# Patient Record
Sex: MALE | Race: WHITE | NOT HISPANIC OR LATINO
[De-identification: names, ages, dates, MRNs, and addresses within clinical notes are randomized per-mention and may not be internally consistent; named-entity substitution may affect disease eponyms.]

---

## 2022-08-23 PROBLEM — Z00.00 ENCOUNTER FOR PREVENTIVE HEALTH EXAMINATION: Status: ACTIVE | Noted: 2022-08-23

## 2022-08-30 ENCOUNTER — APPOINTMENT (OUTPATIENT)
Dept: OTOLARYNGOLOGY | Facility: CLINIC | Age: 37
End: 2022-08-30

## 2022-08-30 VITALS
BODY MASS INDEX: 28.63 KG/M2 | HEIGHT: 70 IN | DIASTOLIC BLOOD PRESSURE: 76 MMHG | WEIGHT: 200 LBS | HEART RATE: 81 BPM | SYSTOLIC BLOOD PRESSURE: 122 MMHG

## 2022-08-30 DIAGNOSIS — J31.0 CHRONIC RHINITIS: ICD-10-CM

## 2022-08-30 DIAGNOSIS — R07.0 PAIN IN THROAT: ICD-10-CM

## 2022-08-30 DIAGNOSIS — F84.0 AUTISTIC DISORDER: ICD-10-CM

## 2022-08-30 DIAGNOSIS — R05.9 COUGH, UNSPECIFIED: ICD-10-CM

## 2022-08-30 PROCEDURE — 99204 OFFICE O/P NEW MOD 45 MIN: CPT | Mod: 25

## 2022-08-30 PROCEDURE — 31575 DIAGNOSTIC LARYNGOSCOPY: CPT

## 2022-08-30 RX ORDER — AZELASTINE HYDROCHLORIDE 137 UG/1
0.1 SPRAY, METERED NASAL TWICE DAILY
Qty: 3 | Refills: 3 | Status: ACTIVE | COMMUNITY
Start: 2022-08-30 | End: 1900-01-01

## 2022-08-30 RX ORDER — FLUOXETINE HYDROCHLORIDE 40 MG/1
CAPSULE ORAL
Refills: 0 | Status: ACTIVE | COMMUNITY

## 2022-08-30 NOTE — REVIEW OF SYSTEMS
[Sneezing] : sneezing [Post Nasal Drip] : post nasal drip [Throat Clearing] : throat clearing [Cough] : cough [Anxiety] : anxiety [Depression] : depression [Negative] : Heme/Lymph

## 2022-08-30 NOTE — HISTORY OF PRESENT ILLNESS
[de-identified] : 36 yr old male w autism spectrum seen with his mother c/o non-productive  cough since going to program 5d/week a few months becoming more persistent, occurs every few minutes\par no relief w zyrtec\par fluoxetine dose was increased when he started the program (followed by psychiatrist)\par no cough during sleep\par +throat clearing, throat  pain\par denies heartburn, GERD\par ?dx of Tourette's \par -cigs

## 2023-08-21 ENCOUNTER — OFFICE (OUTPATIENT)
Dept: URBAN - METROPOLITAN AREA CLINIC 109 | Facility: CLINIC | Age: 38
Setting detail: OPHTHALMOLOGY
End: 2023-08-21
Payer: MEDICARE

## 2023-08-21 DIAGNOSIS — H40.013: ICD-10-CM

## 2023-08-21 PROCEDURE — 99213 OFFICE O/P EST LOW 20 MIN: CPT | Performed by: OPHTHALMOLOGY

## 2023-08-21 ASSESSMENT — VISUAL ACUITY
OD_BCVA: 20/25
OS_BCVA: 20/25+2

## 2023-08-21 ASSESSMENT — SPHEQUIV_DERIVED
OD_SPHEQUIV: -9.125
OS_SPHEQUIV: -0.375
OS_SPHEQUIV: -8.875

## 2023-08-21 ASSESSMENT — REFRACTION_CURRENTRX
OD_VPRISM_DIRECTION: SV
OS_CYLINDER: -1.50
OS_SPHERE: -6.75
OS_VPRISM_DIRECTION: SV
OS_OVR_VA: 20/
OD_OVR_VA: 20/
OD_AXIS: 2
OD_SPHERE: -6.75
OS_AXIS: 187
OD_CYLINDER: -2.25

## 2023-08-21 ASSESSMENT — KERATOMETRY
OS_AXISANGLE_DEGREES: 180
OS_K2POWER_DIOPTERS: 44.00
OD_K1POWER_DIOPTERS: 43.25
OS_K1POWER_DIOPTERS: 43.25
OD_K2POWER_DIOPTERS: 45.00
OD_AXISANGLE_DEGREES: 180

## 2023-08-21 ASSESSMENT — REFRACTION_MANIFEST
OD_AXIS: 180
OS_AXIS: 170
OD_VA1: 20/30
OS_CYLINDER: -1.75
OS_VA1: 20/40
OD_CYLINDER: -2.25
OD_SPHERE: -8.00
OS_SPHERE: -8.00

## 2023-08-21 ASSESSMENT — CONFRONTATIONAL VISUAL FIELD TEST (CVF)
OS_FINDINGS: FULL
OD_FINDINGS: FULL

## 2023-08-21 ASSESSMENT — REFRACTION_AUTOREFRACTION
OS_AXIS: 123
OD_SPHERE: -0.25
OS_SPHERE: 0.00
OS_CYLINDER: -0.75

## 2023-08-21 ASSESSMENT — AXIALLENGTH_DERIVED
OD_AL: 27.4632
OS_AL: 27.5806
OS_AL: 23.6925

## 2023-08-21 ASSESSMENT — SUPERFICIAL PUNCTATE KERATITIS (SPK): OS_SPK: 1+

## 2024-09-16 ENCOUNTER — OFFICE (OUTPATIENT)
Dept: URBAN - METROPOLITAN AREA CLINIC 109 | Facility: CLINIC | Age: 39
Setting detail: OPHTHALMOLOGY
End: 2024-09-16
Payer: MEDICARE

## 2024-09-16 DIAGNOSIS — H16.222: ICD-10-CM

## 2024-09-16 DIAGNOSIS — H40.013: ICD-10-CM

## 2024-09-16 DIAGNOSIS — H43.393: ICD-10-CM

## 2024-09-16 DIAGNOSIS — H26.492: ICD-10-CM

## 2024-09-16 PROBLEM — H16.223 DRY EYE SYNDROME K SICCA; BOTH EYES: Status: ACTIVE | Noted: 2024-09-16

## 2024-09-16 PROCEDURE — 92250 FUNDUS PHOTOGRAPHY W/I&R: CPT | Performed by: OPHTHALMOLOGY

## 2024-09-16 PROCEDURE — 92014 COMPRE OPH EXAM EST PT 1/>: CPT | Performed by: OPHTHALMOLOGY

## 2024-09-16 ASSESSMENT — CONFRONTATIONAL VISUAL FIELD TEST (CVF)
OS_FINDINGS: FULL
OD_FINDINGS: FULL

## 2025-09-03 ENCOUNTER — APPOINTMENT (OUTPATIENT)
Facility: CLINIC | Age: 40
End: 2025-09-03
Payer: MEDICARE

## 2025-09-03 VITALS
WEIGHT: 200 LBS | HEART RATE: 75 BPM | SYSTOLIC BLOOD PRESSURE: 110 MMHG | DIASTOLIC BLOOD PRESSURE: 82 MMHG | HEIGHT: 70 IN | BODY MASS INDEX: 28.63 KG/M2 | OXYGEN SATURATION: 98 %

## 2025-09-03 DIAGNOSIS — R05.9 COUGH, UNSPECIFIED: ICD-10-CM

## 2025-09-03 DIAGNOSIS — K21.9 GASTRO-ESOPHAGEAL REFLUX DISEASE W/OUT ESOPHAGITIS: ICD-10-CM

## 2025-09-03 DIAGNOSIS — R07.0 PAIN IN THROAT: ICD-10-CM

## 2025-09-03 PROCEDURE — 99204 OFFICE O/P NEW MOD 45 MIN: CPT

## 2025-09-03 RX ORDER — BUSPIRONE HYDROCHLORIDE 10 MG/1
10 TABLET ORAL
Refills: 0 | Status: ACTIVE | COMMUNITY

## 2025-09-05 RX ORDER — FAMOTIDINE 20 MG/1
20 TABLET, FILM COATED ORAL
Qty: 60 | Refills: 5 | Status: ACTIVE | COMMUNITY
Start: 2025-09-03 | End: 1900-01-01